# Patient Record
Sex: FEMALE | HISPANIC OR LATINO | Employment: OTHER | URBAN - METROPOLITAN AREA
[De-identification: names, ages, dates, MRNs, and addresses within clinical notes are randomized per-mention and may not be internally consistent; named-entity substitution may affect disease eponyms.]

---

## 2024-09-19 ENCOUNTER — APPOINTMENT (OUTPATIENT)
Dept: OPTOMETRY | Facility: CLINIC | Age: 29
End: 2024-09-19
Payer: COMMERCIAL

## 2024-09-19 ENCOUNTER — OFFICE VISIT (OUTPATIENT)
Dept: OPTOMETRY | Facility: CLINIC | Age: 29
End: 2024-09-19
Payer: COMMERCIAL

## 2024-09-19 DIAGNOSIS — Z01.00 ENCOUNTER FOR EXAMINATION OF EYES AND VISION WITHOUT ABNORMAL FINDINGS: Primary | ICD-10-CM

## 2024-09-19 DIAGNOSIS — H52.13 MYOPIA OF BOTH EYES: ICD-10-CM

## 2024-09-19 DIAGNOSIS — H52.223 REGULAR ASTIGMATISM OF BOTH EYES: ICD-10-CM

## 2024-09-19 PROBLEM — S31.41XS: Status: ACTIVE | Noted: 2024-09-19

## 2024-09-19 PROBLEM — O35.BXX9: Status: ACTIVE | Noted: 2022-05-03

## 2024-09-19 PROBLEM — O34.219 VBAC, DELIVERED: Status: ACTIVE | Noted: 2022-06-24

## 2024-09-19 PROBLEM — M54.50 ACUTE RIGHT-SIDED LOW BACK PAIN: Status: ACTIVE | Noted: 2024-09-19

## 2024-09-19 PROBLEM — R30.0 DYSURIA: Status: ACTIVE | Noted: 2024-09-19

## 2024-09-19 PROBLEM — O34.219 HISTORY OF CESAREAN DELIVERY, ANTEPARTUM: Status: ACTIVE | Noted: 2022-05-10

## 2024-09-19 PROBLEM — R42 DIZZINESS: Status: ACTIVE | Noted: 2024-09-19

## 2024-09-19 PROBLEM — O09.90 SUPERVISION OF HIGH-RISK PREGNANCY: Chronic | Status: ACTIVE | Noted: 2022-05-05

## 2024-09-19 PROCEDURE — V2020 VISION SVCS FRAMES PURCHASES: HCPCS | Performed by: OPTOMETRIST

## 2024-09-19 PROCEDURE — 92004 COMPRE OPH EXAM NEW PT 1/>: CPT | Performed by: OPTOMETRIST

## 2024-09-19 PROCEDURE — 92015 DETERMINE REFRACTIVE STATE: CPT | Performed by: OPTOMETRIST

## 2024-09-19 PROCEDURE — V2100 LENS SPHER SINGLE PLANO 4.00: HCPCS | Mod: LT | Performed by: OPTOMETRIST

## 2024-09-19 ASSESSMENT — REFRACTION_WEARINGRX
OD_AXIS: 153
SPECS_TYPE: SVL
OS_CYLINDER: +1.00
OS_SPHERE: -1.50
OD_CYLINDER: +0.50
OS_AXIS: 001
OD_SPHERE: -1.00

## 2024-09-19 ASSESSMENT — CONF VISUAL FIELD
OS_INFERIOR_TEMPORAL_RESTRICTION: 0
OS_INFERIOR_NASAL_RESTRICTION: 0
OD_INFERIOR_TEMPORAL_RESTRICTION: 0
OS_SUPERIOR_NASAL_RESTRICTION: 0
OD_SUPERIOR_NASAL_RESTRICTION: 0
OS_SUPERIOR_TEMPORAL_RESTRICTION: 0
OD_SUPERIOR_TEMPORAL_RESTRICTION: 0
OD_NORMAL: 1
OD_INFERIOR_NASAL_RESTRICTION: 0
OS_NORMAL: 1
METHOD: COUNTING FINGERS

## 2024-09-19 ASSESSMENT — VISUAL ACUITY
OD_SC: 20/20
METHOD: SNELLEN - LINEAR
OD_CC: 20/20
OD_SC: 20/40
OD_CC+: -2
OS_CC: 20/20
OS_SC: 20/20
OD_CC: 20/20
OS_CC: 20/20
OD_SC+: -2
OS_SC: 20/40
CORRECTION_TYPE: GLASSES

## 2024-09-19 ASSESSMENT — SLIT LAMP EXAM - LIDS
COMMENTS: NORMAL
COMMENTS: NORMAL

## 2024-09-19 ASSESSMENT — CUP TO DISC RATIO
OD_RATIO: 0.45
OS_RATIO: 0.35

## 2024-09-19 ASSESSMENT — REFRACTION_MANIFEST
OD_CYLINDER: +0.50
OD_SPHERE: -1.00
OD_AXIS: 157
OS_AXIS: 002
OS_SPHERE: -1.50
OS_CYLINDER: +0.50

## 2024-09-19 ASSESSMENT — TONOMETRY
IOP_METHOD: APPLANATION
OD_IOP_MMHG: 19
OS_IOP_MMHG: 19

## 2024-09-19 ASSESSMENT — EXTERNAL EXAM - RIGHT EYE: OD_EXAM: NORMAL

## 2024-09-19 ASSESSMENT — EXTERNAL EXAM - LEFT EYE: OS_EXAM: NORMAL

## 2024-09-19 NOTE — PATIENT INSTRUCTIONS
Updated glasses prescription provided today.   Allow 2 weeks to adapt to the new glasses.     Return in 2 years for a comprehensive eye exam, or sooner if needed.      The effects of the dilating drops last for 4- 6 hours.  You will be more sensitive to light and vision will be blurry up close.  Mydriatic sunglasses were given if needed.     Dino Donovan, OD  [facility] [place]  [address] [place] [address]. [place]  [id number]    [phone]

## 2024-09-19 NOTE — LETTER
9/19/2024      Lio Grimaldo  [address] [place] [address]      Dear Colleague,    Thank you for referring your patient, Lio Grimaldo, to the [facility]. Please see a copy of my visit note below.    Chief Complaint   Patient presents with     Annual Eye Exam      Accompanied by family and iPad interp [id number]    Last Eye Exam: 2022  Dilated Previously: Unsure, side effects of dilation explained today    What are you currently using to see?  Glasses - SVL - wears part time        Distance Vision Acuity: Noticed gradual change in both eyes - glasses aren't working as well and give her headaches     Near Vision Acuity: Satisfied with vision while reading and using computer unaided    Eye Comfort: dry itchy occasionally   Do you use eye drops? : Yes; AT's PRN       Lanie Mitchell  Optometry Assistant       Medical, surgical and family histories reviewed and updated 9/19/2024.       OBJECTIVE: See Ophthalmology exam    ASSESSMENT:    ICD-10-CM    1. Encounter for examination of eyes and vision without abnormal findings  Z01.00       2. Myopia of both eyes  H52.13       3. Regular astigmatism of both eyes  H52.223           PLAN:     Patient Instructions   Updated glasses prescription provided today.   Allow 2 weeks to adapt to the new glasses.     Return in 2 years for a comprehensive eye exam, or sooner if needed.      The effects of the dilating drops last for 4- 6 hours.  You will be more sensitive to light and vision will be blurry up close.  Mydriatic sunglasses were given if needed.     Dino Donovan, SHAISTA  [facility]  [address] [place]  [address] [phone]       Again, thank you for allowing me to participate in the care of your patient.        Sincerely,        Dino Donovan, SHAISTA

## 2024-09-19 NOTE — PROGRESS NOTES
Chief Complaint   Patient presents with    Annual Eye Exam      Accompanied by family and iPad interp [id number]    Last Eye Exam: 2022  Dilated Previously: Unsure, side effects of dilation explained today    What are you currently using to see?  Glasses - SVL - wears part time        Distance Vision Acuity: Noticed gradual change in both eyes - glasses aren't working as well and give her headaches     Near Vision Acuity: Satisfied with vision while reading and using computer unaided    Eye Comfort: dry itchy occasionally   Do you use eye drops? : Yes; AT's PRN       Lanie Mitchell  Optometry Assistant       Medical, surgical and family histories reviewed and updated 9/19/2024.       OBJECTIVE: See Ophthalmology exam    ASSESSMENT:    ICD-10-CM    1. Encounter for examination of eyes and vision without abnormal findings  Z01.00       2. Myopia of both eyes  H52.13       3. Regular astigmatism of both eyes  H52.223           PLAN:     Patient Instructions   Updated glasses prescription provided today.   Allow 2 weeks to adapt to the new glasses.     Return in 2 years for a comprehensive eye exam, or sooner if needed.      The effects of the dilating drops last for 4- 6 hours.  You will be more sensitive to light and vision will be blurry up close.  Mydriatic sunglasses were given if needed.     Dino Donovan, OD  [facility]. [place]  [id number]    [phone]

## 2024-10-03 ENCOUNTER — APPOINTMENT (OUTPATIENT)
Dept: OPTOMETRY | Facility: CLINIC | Age: 29
End: 2024-10-03
Payer: COMMERCIAL

## 2024-10-03 PROCEDURE — 92340 FIT SPECTACLES MONOFOCAL: CPT | Performed by: OPTOMETRIST

## 2024-10-06 ENCOUNTER — HEALTH MAINTENANCE LETTER (OUTPATIENT)
Age: 29
End: 2024-10-06